# Patient Record
Sex: MALE | Race: WHITE | NOT HISPANIC OR LATINO | URBAN - METROPOLITAN AREA
[De-identification: names, ages, dates, MRNs, and addresses within clinical notes are randomized per-mention and may not be internally consistent; named-entity substitution may affect disease eponyms.]

---

## 2018-01-22 ENCOUNTER — EMERGENCY (EMERGENCY)
Facility: HOSPITAL | Age: 68
LOS: 0 days | Discharge: HOME | End: 2018-01-23
Attending: STUDENT IN AN ORGANIZED HEALTH CARE EDUCATION/TRAINING PROGRAM | Admitting: STUDENT IN AN ORGANIZED HEALTH CARE EDUCATION/TRAINING PROGRAM
Payer: MEDICARE

## 2018-01-22 VITALS
HEART RATE: 89 BPM | DIASTOLIC BLOOD PRESSURE: 92 MMHG | TEMPERATURE: 99 F | OXYGEN SATURATION: 100 % | RESPIRATION RATE: 18 BRPM | HEIGHT: 66 IN | WEIGHT: 205.91 LBS | SYSTOLIC BLOOD PRESSURE: 148 MMHG

## 2018-01-22 DIAGNOSIS — Z95.5 PRESENCE OF CORONARY ANGIOPLASTY IMPLANT AND GRAFT: ICD-10-CM

## 2018-01-22 DIAGNOSIS — R42 DIZZINESS AND GIDDINESS: ICD-10-CM

## 2018-01-22 DIAGNOSIS — E78.5 HYPERLIPIDEMIA, UNSPECIFIED: ICD-10-CM

## 2018-01-22 DIAGNOSIS — I10 ESSENTIAL (PRIMARY) HYPERTENSION: ICD-10-CM

## 2018-01-22 LAB
ALBUMIN SERPL ELPH-MCNC: 3.7 G/DL — SIGNIFICANT CHANGE UP (ref 3.3–5)
ALP SERPL-CCNC: 76 U/L — SIGNIFICANT CHANGE UP (ref 40–120)
ALT FLD-CCNC: 42 U/L — SIGNIFICANT CHANGE UP (ref 12–78)
ANION GAP SERPL CALC-SCNC: 8 MMOL/L — SIGNIFICANT CHANGE UP (ref 5–17)
APPEARANCE UR: CLEAR — SIGNIFICANT CHANGE UP
AST SERPL-CCNC: 32 U/L — SIGNIFICANT CHANGE UP (ref 15–37)
BASOPHILS # BLD AUTO: 0.1 K/UL — SIGNIFICANT CHANGE UP (ref 0–0.2)
BASOPHILS NFR BLD AUTO: 1 % — SIGNIFICANT CHANGE UP (ref 0–2)
BILIRUB SERPL-MCNC: 0.4 MG/DL — SIGNIFICANT CHANGE UP (ref 0.2–1.2)
BILIRUB UR-MCNC: NEGATIVE — SIGNIFICANT CHANGE UP
BUN SERPL-MCNC: 11 MG/DL — SIGNIFICANT CHANGE UP (ref 7–23)
CALCIUM SERPL-MCNC: 8.6 MG/DL — SIGNIFICANT CHANGE UP (ref 8.5–10.1)
CHLORIDE SERPL-SCNC: 108 MMOL/L — SIGNIFICANT CHANGE UP (ref 96–108)
CO2 SERPL-SCNC: 26 MMOL/L — SIGNIFICANT CHANGE UP (ref 22–31)
COLOR SPEC: YELLOW — SIGNIFICANT CHANGE UP
CREAT SERPL-MCNC: 1.16 MG/DL — SIGNIFICANT CHANGE UP (ref 0.5–1.3)
DIFF PNL FLD: NEGATIVE — SIGNIFICANT CHANGE UP
EOSINOPHIL # BLD AUTO: 0.4 K/UL — SIGNIFICANT CHANGE UP (ref 0–0.5)
EOSINOPHIL NFR BLD AUTO: 4.5 % — SIGNIFICANT CHANGE UP (ref 0–6)
GLUCOSE SERPL-MCNC: 110 MG/DL — HIGH (ref 70–99)
GLUCOSE UR QL: NEGATIVE MG/DL — SIGNIFICANT CHANGE UP
HCT VFR BLD CALC: 45.4 % — SIGNIFICANT CHANGE UP (ref 39–50)
HGB BLD-MCNC: 15.2 G/DL — SIGNIFICANT CHANGE UP (ref 13–17)
INR BLD: 1.02 RATIO — SIGNIFICANT CHANGE UP (ref 0.88–1.16)
KETONES UR-MCNC: NEGATIVE — SIGNIFICANT CHANGE UP
LEUKOCYTE ESTERASE UR-ACNC: NEGATIVE — SIGNIFICANT CHANGE UP
LYMPHOCYTES # BLD AUTO: 1.8 K/UL — SIGNIFICANT CHANGE UP (ref 1–3.3)
LYMPHOCYTES # BLD AUTO: 22.6 % — SIGNIFICANT CHANGE UP (ref 13–44)
MCHC RBC-ENTMCNC: 29.1 PG — SIGNIFICANT CHANGE UP (ref 27–34)
MCHC RBC-ENTMCNC: 33.5 GM/DL — SIGNIFICANT CHANGE UP (ref 32–36)
MCV RBC AUTO: 86.8 FL — SIGNIFICANT CHANGE UP (ref 80–100)
MONOCYTES # BLD AUTO: 0.6 K/UL — SIGNIFICANT CHANGE UP (ref 0–0.9)
MONOCYTES NFR BLD AUTO: 7.6 % — SIGNIFICANT CHANGE UP (ref 2–14)
NEUTROPHILS # BLD AUTO: 5.3 K/UL — SIGNIFICANT CHANGE UP (ref 1.8–7.4)
NEUTROPHILS NFR BLD AUTO: 64.4 % — SIGNIFICANT CHANGE UP (ref 43–77)
NITRITE UR-MCNC: NEGATIVE — SIGNIFICANT CHANGE UP
PH UR: 6.5 — SIGNIFICANT CHANGE UP (ref 5–8)
PLATELET # BLD AUTO: 181 K/UL — SIGNIFICANT CHANGE UP (ref 150–400)
POTASSIUM SERPL-MCNC: 3.9 MMOL/L — SIGNIFICANT CHANGE UP (ref 3.5–5.3)
POTASSIUM SERPL-SCNC: 3.9 MMOL/L — SIGNIFICANT CHANGE UP (ref 3.5–5.3)
PROT SERPL-MCNC: 7.1 GM/DL — SIGNIFICANT CHANGE UP (ref 6–8.3)
PROT UR-MCNC: NEGATIVE MG/DL — SIGNIFICANT CHANGE UP
PROTHROM AB SERPL-ACNC: 11 SEC — SIGNIFICANT CHANGE UP (ref 9.8–12.7)
RAPID RVP RESULT: SIGNIFICANT CHANGE UP
RBC # BLD: 5.23 M/UL — SIGNIFICANT CHANGE UP (ref 4.2–5.8)
RBC # FLD: 11.9 % — SIGNIFICANT CHANGE UP (ref 10.3–14.5)
SODIUM SERPL-SCNC: 142 MMOL/L — SIGNIFICANT CHANGE UP (ref 135–145)
SP GR SPEC: 1.01 — SIGNIFICANT CHANGE UP (ref 1.01–1.02)
TROPONIN I SERPL-MCNC: <0.015 NG/ML — SIGNIFICANT CHANGE UP (ref 0.01–0.04)
TROPONIN I SERPL-MCNC: <0.015 NG/ML — SIGNIFICANT CHANGE UP (ref 0.01–0.04)
UROBILINOGEN FLD QL: NEGATIVE MG/DL — SIGNIFICANT CHANGE UP
WBC # BLD: 8.2 K/UL — SIGNIFICANT CHANGE UP (ref 3.8–10.5)
WBC # FLD AUTO: 8.2 K/UL — SIGNIFICANT CHANGE UP (ref 3.8–10.5)

## 2018-01-22 PROCEDURE — 93010 ELECTROCARDIOGRAM REPORT: CPT

## 2018-01-22 PROCEDURE — 71045 X-RAY EXAM CHEST 1 VIEW: CPT | Mod: 26

## 2018-01-22 PROCEDURE — 70450 CT HEAD/BRAIN W/O DYE: CPT | Mod: 26

## 2018-01-22 PROCEDURE — 99285 EMERGENCY DEPT VISIT HI MDM: CPT

## 2018-01-22 RX ORDER — SODIUM CHLORIDE 9 MG/ML
500 INJECTION INTRAMUSCULAR; INTRAVENOUS; SUBCUTANEOUS ONCE
Qty: 0 | Refills: 0 | Status: COMPLETED | OUTPATIENT
Start: 2018-01-22 | End: 2018-01-22

## 2018-01-22 RX ORDER — ONDANSETRON 8 MG/1
4 TABLET, FILM COATED ORAL ONCE
Qty: 0 | Refills: 0 | Status: COMPLETED | OUTPATIENT
Start: 2018-01-22 | End: 2018-01-22

## 2018-01-22 RX ORDER — MECLIZINE HCL 12.5 MG
25 TABLET ORAL ONCE
Qty: 0 | Refills: 0 | Status: COMPLETED | OUTPATIENT
Start: 2018-01-22 | End: 2018-01-22

## 2018-01-22 RX ORDER — SIMETHICONE 80 MG/1
80 TABLET, CHEWABLE ORAL ONCE
Qty: 0 | Refills: 0 | Status: COMPLETED | OUTPATIENT
Start: 2018-01-22 | End: 2018-01-22

## 2018-01-22 RX ADMIN — ONDANSETRON 4 MILLIGRAM(S): 8 TABLET, FILM COATED ORAL at 20:59

## 2018-01-22 RX ADMIN — Medication 25 MILLIGRAM(S): at 20:59

## 2018-01-22 RX ADMIN — SIMETHICONE 80 MILLIGRAM(S): 80 TABLET, CHEWABLE ORAL at 23:37

## 2018-01-22 RX ADMIN — SODIUM CHLORIDE 500 MILLILITER(S): 9 INJECTION INTRAMUSCULAR; INTRAVENOUS; SUBCUTANEOUS at 20:59

## 2018-01-22 NOTE — ED PROVIDER NOTE - MEDICAL DECISION MAKING DETAILS
66 y/o male with lightheadedness, nausea, dizziness. EKG, CXR, CT head, urine, reevaluate. 68 y/o male with lightheadedness, nausea, dizziness. EKG, CXR, CT head, labs; UA; re-eval

## 2018-01-22 NOTE — ED PROVIDER NOTE - OBJECTIVE STATEMENT
68 y/o male with no PMHx of HTN, HLD, cardiac stent presents to the ED c/o dizziness. Pt states that he sat down for dinner when he felt a sudden onset of sx. Worsens upon moving head, positional change. + nausea. +Lightheadedness, which has since then resolved. Denies prior episodes, vertigo in the past.  Denies CP, chest pressure, SOB, cough, fever, generalized myalgias. Dr. Mireles/Cardio. Received a flu shot. Cardizem, Cozaar. 66 y/o male with no PMHx of HTN, HLD, cardiac stent presents to the ED c/o dizziness. Pt states that he sat down for dinner when he felt a sudden onset of sx. Worsens upon moving head, positional change. + nausea. +Lightheadedness, which has since then resolved. Denies prior episodes, vertigo in the past.  Denies CP, chest pressure, SOB, cough, fever, generalized myalgias. Dr. Mireles/Cardio. Received a flu shot. Cardizem, Cozaar. Patient reports that he called his cardiologist at time of incident- EKG was unchanged from old; no sick contacts; feels better upon arrival.

## 2018-01-22 NOTE — ED PROVIDER NOTE - NEUROLOGICAL, MLM
Alert and oriented, no focal deficits, no motor or sensory deficits. Alert and oriented, no focal deficits, no motor or sensory deficits; no cerebellar ataxia; finger to nose wnl

## 2018-01-22 NOTE — ED PROVIDER NOTE - PROGRESS NOTE DETAILS
Pt is resting comfortably. No acute sx/complaints in ED at this time. second cardiac enzymes is normal and ct head normal . pt sitting up eating in bed. signed out from Dr. Orellana, bobby d/c pt froylan wagner

## 2018-01-23 VITALS
HEART RATE: 70 BPM | RESPIRATION RATE: 18 BRPM | OXYGEN SATURATION: 100 % | TEMPERATURE: 98 F | SYSTOLIC BLOOD PRESSURE: 124 MMHG | DIASTOLIC BLOOD PRESSURE: 87 MMHG

## 2020-02-06 PROBLEM — Z95.5 PRESENCE OF CORONARY ANGIOPLASTY IMPLANT AND GRAFT: Chronic | Status: ACTIVE | Noted: 2018-01-22

## 2020-02-06 PROBLEM — I10 ESSENTIAL (PRIMARY) HYPERTENSION: Chronic | Status: ACTIVE | Noted: 2018-01-22

## 2020-02-06 PROBLEM — E78.5 HYPERLIPIDEMIA, UNSPECIFIED: Chronic | Status: ACTIVE | Noted: 2018-01-22

## 2020-02-17 ENCOUNTER — APPOINTMENT (OUTPATIENT)
Dept: ALLERGY | Facility: CLINIC | Age: 70
End: 2020-02-17
Payer: MEDICARE

## 2020-02-17 VITALS
RESPIRATION RATE: 16 BRPM | OXYGEN SATURATION: 98 % | DIASTOLIC BLOOD PRESSURE: 88 MMHG | HEART RATE: 80 BPM | SYSTOLIC BLOOD PRESSURE: 144 MMHG

## 2020-02-17 PROCEDURE — 99204 OFFICE O/P NEW MOD 45 MIN: CPT

## 2020-02-17 RX ORDER — DILTIAZEM HYDROCHLORIDE 90 MG/1
TABLET ORAL
Refills: 0 | Status: ACTIVE | COMMUNITY

## 2020-02-17 RX ORDER — PRASUGREL HYDROCHLORIDE 10 MG/1
TABLET, COATED ORAL
Refills: 0 | Status: ACTIVE | COMMUNITY

## 2020-02-17 RX ORDER — PITAVASTATIN CALCIUM 4.18 MG/1
TABLET, FILM COATED ORAL
Refills: 0 | Status: ACTIVE | COMMUNITY

## 2020-02-17 RX ORDER — OMEPRAZOLE 20 MG/1
20 CAPSULE, DELAYED RELEASE ORAL
Refills: 0 | Status: ACTIVE | COMMUNITY

## 2020-02-17 NOTE — PHYSICAL EXAM
[Alert] : alert [Well Nourished] : well nourished [No Acute Distress] : no acute distress [Well Developed] : well developed [Healthy Appearance] : healthy appearance [Normal Pupil & Iris Size/Symmetry] : normal pupil and iris size and symmetry [No Discharge] : no discharge [No Photophobia] : no photophobia [Sclera Not Icteric] : sclera not icteric [Normal Nasal Mucosa] : the nasal mucosa was normal [Normal Lips/Tongue] : the lips and tongue were normal [Normal Dentition] : normal dentition [Normal Tonsils] : normal tonsils [No Oral Lesions or Ulcers] : no oral lesions or ulcers [No Neck Mass] : no neck mass was observed [No LAD] : no lymphadenopathy [No Thyroid Mass] : no thyroid mass [Supple] : the neck was supple [Normal Palpation] : palpation of the chest revealed no abnormalities [Normal Rate and Effort] : normal respiratory rhythm and effort [No Retractions] : no retractions [No Crackles] : no crackles [Bilateral Audible Breath Sounds] : bilateral audible breath sounds [No murmur] : no murmur [Normal Rate] : heart rate was normal  [Normal S1, S2] : normal S1 and S2 [Regular Rhythm] : with a regular rhythm [Normal Cervical Lymph Nodes] : cervical [Skin Intact] : skin intact  [No Rash] : no rash [No Skin Lesions] : no skin lesions [No clubbing] : no clubbing [No Joint Swelling or Erythema] : no joint swelling or erythema [No Cyanosis] : no cyanosis [No Edema] : no edema [Normal Mood] : mood was normal [Alert, Awake, Oriented as Age-Appropriate] : alert, awake, oriented as age appropriate [Normal Affect] : affect was normal

## 2020-02-24 ENCOUNTER — APPOINTMENT (OUTPATIENT)
Dept: ALLERGY | Facility: CLINIC | Age: 70
End: 2020-02-24

## 2020-02-25 NOTE — HISTORY OF PRESENT ILLNESS
[Asthma] : asthma [Allergic Rhinitis] : allergic rhinitis [Eczematous rashes] : eczematous rashes [Venom Reactions] : venom reactions [de-identified] : About 12 years ago he noted a lump sensation in his upper throat - he had a GI and cardiac work up - normal evaluation.   About 18 months later he felt that if he took an aspirin - Motrin - Aleve - about 4-5 hours later he would develop a sensation of throat fullness.   Symptoms resolved sometimes without medications or with Benadryl.   His symptoms have not recurred since he stopped the medications. \par \par He recently had sciatica and was treated with prednisone and Medrol.   \par \par He has a history of colitis.   History of rotator cuff injury.  Patient almost required back surgery secondary to sciatica but his symptoms lessened with high dose steroids.    Patient took Pepto Bismol and he tolerated the drug challenge with no reaction.  \par \par He saw an allergist 4 years ago - routine allergy skin testing

## 2020-02-25 NOTE — SOCIAL HISTORY
[Spouse/Partner] : spouse/partner [None] : none [] :  [FreeTextEntry2] : Retired  [Smokers in Household] : there are no smokers in the home

## 2020-02-25 NOTE — ASSESSMENT
[FreeTextEntry1] : Delayed reaction to NSAIDs - patient has tolerated Pepto Bismol with no reaction.   Mr. Herman has had severe sciatica and a rotator cuff injury which has been treated with oral steroids.   He will RV for an incremental oral challenge to Celebrex.  In addition he was advised bring in Pepto Bismol he had taken so I can determine what dosage of ASA he has tolerated. \par \par Addendum:  Mr. Herman cancelled his oral challenge and he has not set up any follow up appointment.

## 2020-02-25 NOTE — CONSULT LETTER
[Thank you for referring [unfilled] for consultation for _____] : Thank you for referring [unfilled] for consultation for [unfilled] [Dear  ___] : Dear  [unfilled], [Sincerely,] : Sincerely, [Please see my note below.] : Please see my note below. [FreeTextEntry3] : Mitchell B. Boxer, M.D., FAAAAI\par [facility]\par \par Department of Allergy-Immunology\par [facility] \par [address] [place] [address]\par [place] [address]\par Tel:   [phone]\par Fax:  [phone]\par Email: [email or web address]\par

## 2020-06-01 DIAGNOSIS — R09.89 OTHER SPECIFIED SYMPTOMS AND SIGNS INVOLVING THE CIRCULATORY AND RESPIRATORY SYSTEMS: ICD-10-CM

## 2020-06-02 ENCOUNTER — NON-APPOINTMENT (OUTPATIENT)
Age: 70
End: 2020-06-02

## 2020-06-05 ENCOUNTER — APPOINTMENT (OUTPATIENT)
Dept: OTOLARYNGOLOGY | Facility: CLINIC | Age: 70
End: 2020-06-05
Payer: MEDICARE

## 2020-06-05 VITALS
HEIGHT: 66 IN | DIASTOLIC BLOOD PRESSURE: 90 MMHG | HEART RATE: 112 BPM | WEIGHT: 225 LBS | SYSTOLIC BLOOD PRESSURE: 157 MMHG | TEMPERATURE: 97.34 F | BODY MASS INDEX: 36.16 KG/M2

## 2020-06-05 DIAGNOSIS — J31.0 CHRONIC RHINITIS: ICD-10-CM

## 2020-06-05 DIAGNOSIS — R20.8 OTHER DISTURBANCES OF SKIN SENSATION: ICD-10-CM

## 2020-06-05 PROCEDURE — 99204 OFFICE O/P NEW MOD 45 MIN: CPT

## 2020-06-05 RX ORDER — LIDOCAINE HYDROCHLORIDE 40 MG/ML
4 SOLUTION TOPICAL 3 TIMES DAILY
Qty: 1 | Refills: 0 | Status: ACTIVE | COMMUNITY
Start: 2020-06-05 | End: 1900-01-01

## 2020-06-05 NOTE — PHYSICAL EXAM
[Midline] : trachea located in midline position [Normal] : orientation to person, place, and time: normal [de-identified] : Small excoriation on left anterior inferior nasal septum on septal spur

## 2020-06-05 NOTE — HISTORY OF PRESENT ILLNESS
[de-identified] : 69 y/o M, Notes yesterday had Colonoscopy / endoscopy.  When he woke up had severe left nasal burning.  Today had some sinus pressure and clear nasal d/c.  Called Dr. Chavez and placed on Medrol dose pack that he started this morning.  Also used Azelastine and Flonase today.   He is here now concerned that the nasal canula may have eroded a hole in the septum.

## 2020-06-05 NOTE — ASSESSMENT
[FreeTextEntry1] : Left nasal septum with small ulceration, likely due to nasal cannula prong rubbing on left deviated septum/septal spur during colonoscopy:\par - reassured no evidence of septal perforation or any serious damage, rather a superficial excoriation which should heal quickly\par - Use mupirocin to the septum for one week\par - Lidocaine to left nasal septum for pain PRN. \par - F/U PRN

## 2020-06-08 ENCOUNTER — APPOINTMENT (OUTPATIENT)
Dept: OTOLARYNGOLOGY | Facility: CLINIC | Age: 70
End: 2020-06-08

## 2020-06-10 ENCOUNTER — APPOINTMENT (OUTPATIENT)
Dept: OTOLARYNGOLOGY | Facility: CLINIC | Age: 70
End: 2020-06-10
Payer: MEDICARE

## 2020-06-10 VITALS
SYSTOLIC BLOOD PRESSURE: 125 MMHG | TEMPERATURE: 208.22 F | BODY MASS INDEX: 36.16 KG/M2 | HEIGHT: 66 IN | DIASTOLIC BLOOD PRESSURE: 78 MMHG | WEIGHT: 225 LBS

## 2020-06-10 DIAGNOSIS — J34.89 OTHER SPECIFIED DISORDERS OF NOSE AND NASAL SINUSES: ICD-10-CM

## 2020-06-10 PROCEDURE — 99213 OFFICE O/P EST LOW 20 MIN: CPT | Mod: 25

## 2020-06-10 PROCEDURE — 31231 NASAL ENDOSCOPY DX: CPT

## 2020-06-10 RX ORDER — DILTIAZEM HYDROCHLORIDE 120 MG/1
120 CAPSULE, EXTENDED RELEASE ORAL
Qty: 90 | Refills: 0 | Status: ACTIVE | COMMUNITY
Start: 2020-04-06

## 2020-06-10 RX ORDER — MUPIROCIN 20 MG/G
2 OINTMENT TOPICAL
Qty: 22 | Refills: 0 | Status: ACTIVE | COMMUNITY
Start: 2020-06-01

## 2020-06-10 RX ORDER — MESALAMINE 4 G/60ML
4 ENEMA RECTAL
Qty: 1680 | Refills: 0 | Status: ACTIVE | COMMUNITY
Start: 2020-06-04

## 2020-06-10 RX ORDER — AZELASTINE HYDROCHLORIDE 137 UG/1
0.1 SPRAY, METERED NASAL
Qty: 90 | Refills: 0 | Status: ACTIVE | COMMUNITY
Start: 2020-03-23

## 2020-06-10 RX ORDER — TOBRAMYCIN AND DEXAMETHASONE 3; 1 MG/ML; MG/ML
0.3-0.1 SUSPENSION/ DROPS OPHTHALMIC
Qty: 5 | Refills: 0 | Status: ACTIVE | COMMUNITY
Start: 2020-05-07

## 2020-06-10 RX ORDER — TESTOSTERONE 16.2 MG/G
20.25 MG/ACT GEL TRANSDERMAL
Qty: 225 | Refills: 0 | Status: ACTIVE | COMMUNITY
Start: 2020-05-06

## 2020-06-10 RX ORDER — METHYLPREDNISOLONE 4 MG/1
4 TABLET ORAL
Qty: 21 | Refills: 0 | Status: ACTIVE | COMMUNITY
Start: 2020-06-05

## 2020-06-10 RX ORDER — LOSARTAN POTASSIUM 50 MG/1
50 TABLET, FILM COATED ORAL
Qty: 90 | Refills: 0 | Status: ACTIVE | COMMUNITY
Start: 2020-04-03

## 2020-06-10 RX ORDER — HYDROCORTISONE 25 MG/G
2.5 CREAM TOPICAL
Qty: 60 | Refills: 0 | Status: ACTIVE | COMMUNITY
Start: 2020-03-02

## 2020-06-10 NOTE — REASON FOR VISIT
[Subsequent Evaluation] : a subsequent evaluation for [FreeTextEntry2] : left nasal congestion/irritation

## 2020-06-10 NOTE — ASSESSMENT
[FreeTextEntry1] : Left nasal septum with small ulceration, now resolved.  Nasal discomfort likely due to irritation\par - stop topical antibiotics which may be causing more irritation\par - Continue with Flonase and Azelastine for congestion. \par - resume sinus wash. \par - F/U PRN

## 2020-06-10 NOTE — PHYSICAL EXAM
[Midline] : trachea located in midline position [de-identified] : well healed septum with no abnormalities other than spur [Normal] : no nystagmus [de-identified] : left medial subconjunctival hemorrhage, no chemosis or erythema, no swelling independent/needs device

## 2020-06-10 NOTE — PROCEDURE
[FreeTextEntry6] : Afrin and lidocaine were topically applied. Flexible scope #25 was used. Right nasal passage with normal inferior, middle and superior turbinates. Nasal passage patent with clear middle meatus and sphenoethmoid recess. No mucopurulence or polyps appreciated. No lesions or trauma noted. Nasopharynx clear.\par \par

## 2020-06-10 NOTE — HISTORY OF PRESENT ILLNESS
[de-identified] : 69 y/o M, At last visit noted to have mild ulceration on left nasal septum.  Using Mupirocin and Flonase and neosporin.\par Now notes increased nasal congestion on left side.  Notes needs to blow nose forcefully multiple times a day. He blew nose so forcefully that he caused a left subconjunctival hemorrhage. He has had this evaluated by ophtho and assured will resolve. Notes no bleeding from the nose. No discolored discharge. Significant discomfort and congestion at night makes sleeping difficult.

## 2020-11-09 ENCOUNTER — APPOINTMENT (OUTPATIENT)
Dept: OTOLARYNGOLOGY | Facility: CLINIC | Age: 70
End: 2020-11-09
Payer: MEDICARE

## 2020-11-09 VITALS
SYSTOLIC BLOOD PRESSURE: 137 MMHG | BODY MASS INDEX: 31.82 KG/M2 | WEIGHT: 198 LBS | HEIGHT: 66 IN | TEMPERATURE: 206.96 F | DIASTOLIC BLOOD PRESSURE: 83 MMHG | HEART RATE: 74 BPM

## 2020-11-09 DIAGNOSIS — G47.33 OBSTRUCTIVE SLEEP APNEA (ADULT) (PEDIATRIC): ICD-10-CM

## 2020-11-09 PROCEDURE — 31231 NASAL ENDOSCOPY DX: CPT

## 2020-11-09 PROCEDURE — 99214 OFFICE O/P EST MOD 30 MIN: CPT | Mod: 25

## 2020-11-09 RX ORDER — AZELASTINE HYDROCHLORIDE AND FLUTICASONE PROPIONATE 137; 50 UG/1; UG/1
137-50 SPRAY, METERED NASAL
Qty: 1 | Refills: 0 | Status: ACTIVE | COMMUNITY
Start: 2020-11-09 | End: 1900-01-01

## 2020-11-09 NOTE — ASSESSMENT
[FreeTextEntry1] : Patient with severe obstructive sleep apnea has lost 18 pounds since his last visit looks great.  Complaining of some continued nasal congestion currently taking Flonase as well as Astelin Astelin but he stopped using it and he certainly understands that it may help him.  He has had some GI issues with significant belching further questioning he has been taking Mucinex quite a bit told him to stop that because it can have some GI side effects.  The rest of his examination is normal endoscopically does have his baseline deviated septum.  He is heading in the right direction we will try to get Dymista or if it is covered otherwise he will religiously use the Flonase and the Astelin follow-up and see us as needed.

## 2020-11-09 NOTE — HISTORY OF PRESENT ILLNESS
[de-identified] : Since February or march of this year patient has had excessive belching. He was been told by his GI that he may be breathing through his mouth and swallowing air. He uses a CPAP to breathe for his apnea and states that he gets nasally congestion for about a half hour the middle of the night. He is on Pepcid daily but this has not helped. He admits that he is a mouth breather during the day as well as at night. He had a gallbladder US that showed stones but nothing else. He feels like his blood pressure rises in the middle of the night and this causes. He was prescribed azelastine but has not been using this, and instead is using the flonase. He has also tried  Mucinex 12 hour as well frequently over the last several months or so.

## 2020-11-09 NOTE — REVIEW OF SYSTEMS
[Nasal Congestion] : nasal congestion [Problem Snoring] : problem snoring [Snoring With Pauses] : snoring with pauses [Negative] : Mouth and Throat [de-identified] : uses CPAP [FreeTextEntry7] : belching

## 2020-11-12 RX ORDER — AZELASTINE HYDROCHLORIDE 137 UG/1
137 SPRAY, METERED NASAL DAILY
Qty: 3 | Refills: 3 | Status: ACTIVE | COMMUNITY
Start: 2020-11-12 | End: 1900-01-01

## 2020-11-12 RX ORDER — FLUTICASONE PROPIONATE 50 UG/1
50 SPRAY, METERED NASAL DAILY
Qty: 1 | Refills: 2 | Status: ACTIVE | COMMUNITY
Start: 2020-11-12 | End: 1900-01-01

## 2020-12-11 ENCOUNTER — NON-APPOINTMENT (OUTPATIENT)
Age: 70
End: 2020-12-11

## 2021-03-19 ENCOUNTER — APPOINTMENT (OUTPATIENT)
Dept: OTOLARYNGOLOGY | Facility: CLINIC | Age: 71
End: 2021-03-19
Payer: MEDICARE

## 2021-03-19 VITALS
HEIGHT: 66 IN | DIASTOLIC BLOOD PRESSURE: 82 MMHG | WEIGHT: 205 LBS | SYSTOLIC BLOOD PRESSURE: 134 MMHG | HEART RATE: 80 BPM | BODY MASS INDEX: 32.95 KG/M2 | TEMPERATURE: 95 F

## 2021-03-19 DIAGNOSIS — H93.13 TINNITUS, BILATERAL: ICD-10-CM

## 2021-03-19 PROCEDURE — 99214 OFFICE O/P EST MOD 30 MIN: CPT

## 2021-03-19 NOTE — ASSESSMENT
[FreeTextEntry1] : Patient is a 70 year old male who presents with new onset b/l tinnitus.\par \par Plan\par -Patient returns new onset of tinnitus is more concerned in terms of his anxiety we explained everything to him the most likely this will resolve he will continue to follow-up with us as needed he is continuing to lose weight he looks great he does not want a hearing test at this time will get it at next visit on examination is ears look perfectly normal with no cerumen or no infection.

## 2022-10-26 ENCOUNTER — APPOINTMENT (OUTPATIENT)
Dept: OTOLARYNGOLOGY | Facility: CLINIC | Age: 72
End: 2022-10-26

## 2022-10-26 VITALS
HEIGHT: 66 IN | DIASTOLIC BLOOD PRESSURE: 67 MMHG | HEART RATE: 93 BPM | BODY MASS INDEX: 34.07 KG/M2 | SYSTOLIC BLOOD PRESSURE: 124 MMHG | WEIGHT: 212 LBS

## 2022-10-26 DIAGNOSIS — H65.92 UNSPECIFIED NONSUPPURATIVE OTITIS MEDIA, LEFT EAR: ICD-10-CM

## 2022-10-26 PROCEDURE — 92567 TYMPANOMETRY: CPT

## 2022-10-26 PROCEDURE — 92557 COMPREHENSIVE HEARING TEST: CPT

## 2022-10-26 PROCEDURE — 99214 OFFICE O/P EST MOD 30 MIN: CPT

## 2022-10-26 RX ORDER — AMOXICILLIN AND CLAVULANATE POTASSIUM 875; 125 MG/1; MG/1
875-125 TABLET, COATED ORAL
Qty: 28 | Refills: 0 | Status: ACTIVE | COMMUNITY
Start: 2022-10-22

## 2022-10-26 RX ORDER — CLINDAMYCIN PHOSPHATE 1 G/10ML
1 GEL TOPICAL
Qty: 60 | Refills: 0 | Status: ACTIVE | COMMUNITY
Start: 2022-07-27

## 2022-10-26 RX ORDER — AZITHROMYCIN 250 MG/1
250 TABLET, FILM COATED ORAL
Qty: 6 | Refills: 0 | Status: ACTIVE | COMMUNITY
Start: 2022-10-12

## 2022-10-26 RX ORDER — SILDENAFIL 20 MG/1
20 TABLET ORAL
Qty: 90 | Refills: 0 | Status: ACTIVE | COMMUNITY
Start: 2022-08-10

## 2022-10-26 RX ORDER — PREGABALIN 25 MG/1
25 CAPSULE ORAL
Qty: 360 | Refills: 0 | Status: ACTIVE | COMMUNITY
Start: 2022-02-25

## 2022-10-26 RX ORDER — MAGNESIUM HYDROXIDE 1200 MG/15ML
0.9 LIQUID ORAL
Qty: 9000 | Refills: 0 | Status: ACTIVE | COMMUNITY
Start: 2022-10-14

## 2022-10-26 RX ORDER — ALIROCUMAB 75 MG/ML
75 INJECTION, SOLUTION SUBCUTANEOUS
Qty: 6 | Refills: 0 | Status: ACTIVE | COMMUNITY
Start: 2022-07-05

## 2022-10-26 RX ORDER — CELECOXIB 50 MG/1
50 CAPSULE ORAL
Qty: 30 | Refills: 0 | Status: ACTIVE | COMMUNITY
Start: 2022-08-17

## 2022-10-26 RX ORDER — ALFUZOSIN HYDROCHLORIDE 10 MG/1
10 TABLET, EXTENDED RELEASE ORAL
Qty: 90 | Refills: 0 | Status: ACTIVE | COMMUNITY
Start: 2022-10-17

## 2022-10-26 RX ORDER — ALBUTEROL SULFATE 90 UG/1
108 (90 BASE) INHALANT RESPIRATORY (INHALATION)
Qty: 8 | Refills: 0 | Status: ACTIVE | COMMUNITY
Start: 2022-10-12

## 2022-10-26 RX ORDER — PRASUGREL 5 MG/1
5 TABLET, FILM COATED ORAL
Qty: 180 | Refills: 0 | Status: ACTIVE | COMMUNITY
Start: 2022-06-25

## 2022-10-26 RX ORDER — TAMSULOSIN HYDROCHLORIDE 0.4 MG/1
0.4 CAPSULE ORAL
Qty: 90 | Refills: 0 | Status: ACTIVE | COMMUNITY
Start: 2022-05-09

## 2022-10-26 NOTE — ASSESSMENT
[FreeTextEntry1] : Patient seen had an episode of upper respiratory tract infection that is resulted in clogged left ear he was treated with steroids and his ear opened up for a day and then got clogged after he stopped he is now back on a steroid taper.  He feels that his left ear is still not unclog it is bothering him on examination he does not look terrible right ear looks fine audiogram was performed seems to be all consistent with eustachian tube dysfunction type C temp of his left ear consistent with negative pressure.  Hearing is otherwise symmetrical except for slight asymmetry in the high frequencies on the left side which will be monitored.  Follow-up and see us in a month.  If that asymmetry were to worsen consideration obviously for an MRI to rule out an unlikely acoustic neuroma will be considered.

## 2022-10-26 NOTE — END OF VISIT
[FreeTextEntry3] : I, Dr. Chavez personally performed the evaluation and management (E/M) services including all necessary procedures, for this new patient. That E/M includes conducting the clinically appropriate initial history &/or exam, assessing all conditions, and establishing the plan of care. Today, my ANASTASIA, Abigail Brothers, was here to observe &/or participate in the visit & follow plan of care established by me.\par \par \par

## 2022-10-26 NOTE — REVIEW OF SYSTEMS
[Hearing Loss] : hearing loss [Negative] : Heme/Lymph [de-identified] : left ear hearing loss and clogging

## 2022-10-26 NOTE — HISTORY OF PRESENT ILLNESS
[de-identified] : Three weeks ago patient got a cough, productive, yellow green mucus, and feels it went into his left ear. He describes that his left ear feels constantly clogged and blocked. He was on Augmentin for the left ear- another doctor placed him medrol this past sunday, but Monday morning after day 1 of the steroid pack, he woke up and felt better- then two hours later he was clogged again- worse. He spoke to another MD who this time prescribed a prednisone taper, 40 mg today, 30mg tomorrow, etc, as well as Sudafed (but this affects his prostate and speeds heart so he cant take that) \par \par THe main complaint is the blockage of the left ear that has persisted this entire time. He had a hearing test at [facility] last week and states that the left ear showed hearing loss but doesn’t know if it was definitely conductive loss. \par

## 2022-10-26 NOTE — PHYSICAL EXAM
[Nasal Endoscopy Performed] : nasal endoscopy was performed, see procedure section for findings [] : septum deviated to the left [Midline] : trachea located in midline position [Normal] : no rashes [de-identified] : mild serous behind the left TM

## 2022-11-01 ENCOUNTER — APPOINTMENT (OUTPATIENT)
Dept: OTOLARYNGOLOGY | Facility: CLINIC | Age: 72
End: 2022-11-01

## 2022-11-01 VITALS
DIASTOLIC BLOOD PRESSURE: 75 MMHG | TEMPERATURE: 97.4 F | WEIGHT: 213 LBS | HEIGHT: 67 IN | HEART RATE: 76 BPM | BODY MASS INDEX: 33.43 KG/M2 | SYSTOLIC BLOOD PRESSURE: 139 MMHG

## 2022-11-01 DIAGNOSIS — R09.81 NASAL CONGESTION: ICD-10-CM

## 2022-11-01 PROCEDURE — 99214 OFFICE O/P EST MOD 30 MIN: CPT

## 2022-11-01 RX ORDER — LEVOFLOXACIN 750 MG/1
750 TABLET, FILM COATED ORAL DAILY
Qty: 10 | Refills: 2 | Status: ACTIVE | COMMUNITY
Start: 2022-11-01 | End: 1900-01-01

## 2022-11-01 NOTE — HISTORY OF PRESENT ILLNESS
[de-identified] : Last night the left ear started to open a little, but now he has some discomfort along the left throat and continues to feel that the left ear clogged. His voice sounds slightly raspy. He denies dizziness. His situation has worsened in that it is bothering him more and more each day. He prefers to stay home, not go out, and stay in bed in stead as a result of hos he is feeling.

## 2022-11-01 NOTE — REVIEW OF SYSTEMS
[Ear Pain] : ear pain [Hearing Loss] : hearing loss [Nasal Congestion] : nasal congestion [Hoarseness] : hoarseness [Throat Pain] : throat pain [Negative] : Heme/Lymph [de-identified] : ear clogging

## 2022-11-01 NOTE — ASSESSMENT
[FreeTextEntry1] : Patient suffers from anxiety is getting fatigued seems to be crashing from the steroids he was given.  Ear examination shows no reaccumulation of any fluid because of his anxiety I sent her for an MRI to rule out any intracranial involvement more for peace of mind understood.  He will follow-up with us he was reassured he is heading in the right direction.  Do recommend that he see his primary care internist for general physical.  We discussed the importance of him to try to lose weight and continue to increase his exercise load.\par \par \par \par \par Addendum patient had an MRI done at [place] today was found to have a mastoid effusion on the left side for that reason we put him on Levaquin for 10days.

## 2022-11-03 ENCOUNTER — NON-APPOINTMENT (OUTPATIENT)
Age: 72
End: 2022-11-03

## 2022-11-14 ENCOUNTER — APPOINTMENT (OUTPATIENT)
Dept: OTOLARYNGOLOGY | Facility: CLINIC | Age: 72
End: 2022-11-14

## 2022-11-14 PROCEDURE — 99214 OFFICE O/P EST MOD 30 MIN: CPT

## 2022-11-14 PROCEDURE — 92567 TYMPANOMETRY: CPT

## 2022-11-14 PROCEDURE — 92557 COMPREHENSIVE HEARING TEST: CPT

## 2022-11-14 NOTE — END OF VISIT
[FreeTextEntry3] : I, Dr. Chavez personally performed the evaluation and management (E/M) services , including all procedures, for this established patient who presents today with (a) new problem(s)/exacerbation of (an) existing condition(s). That E/M includes conducting the clinically appropriate interval history &/or exam, assessing all new/exacerbated conditions, and establishing a new plan of care. Today, my ANASTASIA, Abigail Brothers, was here to observe &/or participate in the visit & follow plan of care established by me.\par \par \par

## 2022-11-14 NOTE — ASSESSMENT
[FreeTextEntry1] : Patient follows up continues to have intermittent clogged sensation in his left ear on examination again his ear looks fine audiogram confirmed type a temps reassured him to continue to be patient obviously through his patients who can always put a tube in but he understands that that might not necessarily expedite his resolution of his symptoms and create other issues.  Patient does have underlying anxieties we reviewed everything an option and will come back and see us in 2 weeks if at that time he still loses patience consideration for tube will be given but highly unlikely.

## 2022-12-01 ENCOUNTER — APPOINTMENT (OUTPATIENT)
Dept: OTOLARYNGOLOGY | Facility: CLINIC | Age: 72
End: 2022-12-01

## 2022-12-01 VITALS
BODY MASS INDEX: 33.43 KG/M2 | DIASTOLIC BLOOD PRESSURE: 75 MMHG | HEART RATE: 72 BPM | WEIGHT: 213 LBS | HEIGHT: 67 IN | TEMPERATURE: 98 F | SYSTOLIC BLOOD PRESSURE: 135 MMHG

## 2022-12-01 PROCEDURE — 99213 OFFICE O/P EST LOW 20 MIN: CPT

## 2022-12-01 PROCEDURE — 92567 TYMPANOMETRY: CPT

## 2022-12-01 PROCEDURE — 92557 COMPREHENSIVE HEARING TEST: CPT

## 2022-12-01 RX ORDER — FAMOTIDINE 20 MG/1
20 TABLET, FILM COATED ORAL
Qty: 360 | Refills: 0 | Status: ACTIVE | COMMUNITY
Start: 2022-11-05

## 2022-12-01 RX ORDER — FLUTICASONE PROPIONATE 50 UG/1
50 SPRAY, METERED NASAL DAILY
Qty: 1 | Refills: 3 | Status: ACTIVE | COMMUNITY
Start: 2022-12-01 | End: 1900-01-01

## 2022-12-01 NOTE — ASSESSMENT
[FreeTextEntry1] : Patient follows up doing well left ear finally starting to feel normal open most of the time now 80% of the time heading in the right direction audiogram performed perfectly normal back to his baseline no further acute interventions indicated we will follow-up with us in 3 months.

## 2022-12-01 NOTE — HISTORY OF PRESENT ILLNESS
[de-identified] : A week or so ago patients left ear opened. Since then, it has intermittently clogged and then opened up. He definitely feels better and is able to hear. He does not have any acute nasal congestion or runny nose and denies sinus pressure

## 2023-01-09 ENCOUNTER — APPOINTMENT (OUTPATIENT)
Dept: OTOLARYNGOLOGY | Facility: CLINIC | Age: 73
End: 2023-01-09

## 2023-01-17 RX ORDER — CLARITHROMYCIN 250 MG/1
250 TABLET, FILM COATED ORAL
Qty: 14 | Refills: 0 | Status: ACTIVE | COMMUNITY
Start: 2023-01-17 | End: 1900-01-01

## 2023-03-20 ENCOUNTER — APPOINTMENT (OUTPATIENT)
Dept: OTOLARYNGOLOGY | Facility: CLINIC | Age: 73
End: 2023-03-20
Payer: MEDICARE

## 2023-03-20 VITALS
SYSTOLIC BLOOD PRESSURE: 143 MMHG | TEMPERATURE: 97.2 F | HEART RATE: 74 BPM | BODY MASS INDEX: 33.43 KG/M2 | DIASTOLIC BLOOD PRESSURE: 82 MMHG | WEIGHT: 213 LBS | HEIGHT: 67 IN

## 2023-03-20 PROCEDURE — 99213 OFFICE O/P EST LOW 20 MIN: CPT

## 2023-03-20 NOTE — ASSESSMENT
[FreeTextEntry1] : Follows up continues to have intermittent clogging of his left ear consistent with eustachian tube dysfunction most recently lasted about 2 to 3 weeks and just today felt that it popped open we continued a conversation of pros and cons of considering the tube based on his quality of life spring is around the corner we both decided to wait and see what happens in spring and summer if the symptoms were to persist he understands the neck step is to put a tube in his left ear.

## 2023-03-20 NOTE — HISTORY OF PRESENT ILLNESS
[de-identified] : Patient comes in with return of left ear clogging and intermittently popping. He has been using the Flonase with no change.  He admits that he was doing well for two weeks and then this started again, but intermittently. He admits that he is not looking to have an ear tube placed and would like to be treated medically again

## 2023-03-20 NOTE — REVIEW OF SYSTEMS
[As Noted in HPI] : as noted in HPI [Negative] : Heme/Lymph [de-identified] : ear clogging left sided

## 2023-04-27 ENCOUNTER — APPOINTMENT (OUTPATIENT)
Dept: OTOLARYNGOLOGY | Facility: CLINIC | Age: 73
End: 2023-04-27

## 2024-03-14 ENCOUNTER — TRANSCRIPTION ENCOUNTER (OUTPATIENT)
Age: 74
End: 2024-03-14

## 2024-05-17 ENCOUNTER — APPOINTMENT (OUTPATIENT)
Dept: OTOLARYNGOLOGY | Facility: CLINIC | Age: 74
End: 2024-05-17
Payer: MEDICARE

## 2024-05-17 VITALS
HEIGHT: 67 IN | BODY MASS INDEX: 32.33 KG/M2 | WEIGHT: 206 LBS | SYSTOLIC BLOOD PRESSURE: 120 MMHG | HEART RATE: 79 BPM | DIASTOLIC BLOOD PRESSURE: 66 MMHG

## 2024-05-17 PROCEDURE — 99214 OFFICE O/P EST MOD 30 MIN: CPT

## 2024-05-17 NOTE — REASON FOR VISIT
[Subsequent Evaluation] : a subsequent evaluation for [FreeTextEntry2] : Left submandibular discomfort

## 2024-05-17 NOTE — HISTORY OF PRESENT ILLNESS
[de-identified] : Patient since he was last seen had 3 stents placed cardiac wise.  He is developing a little bit of discomfort in his left submandibular region which was tender.  He was put on Zithromax couple days ago.  Feeling a little better.  But still discomfort.  On examination he has a palpable discomfort in the submandibular gland confirmed by manual palpation.  Reassured him he is on the appropriate antibiotics and this will resolve I have encouraged him to continue using some lemon drops and stay relatively hydrated he is on a blood thinner now and will need to be careful regarding trauma.

## 2024-05-28 ENCOUNTER — APPOINTMENT (OUTPATIENT)
Dept: OTOLARYNGOLOGY | Facility: CLINIC | Age: 74
End: 2024-05-28
Payer: MEDICARE

## 2024-05-28 VITALS
BODY MASS INDEX: 32.49 KG/M2 | DIASTOLIC BLOOD PRESSURE: 80 MMHG | HEIGHT: 67 IN | WEIGHT: 207 LBS | SYSTOLIC BLOOD PRESSURE: 127 MMHG

## 2024-05-28 DIAGNOSIS — H69.92 UNSPECIFIED EUSTACHIAN TUBE DISORDER, LEFT EAR: ICD-10-CM

## 2024-05-28 DIAGNOSIS — K11.20 SIALOADENITIS, UNSPECIFIED: ICD-10-CM

## 2024-05-28 DIAGNOSIS — M26.622 ARTHRALGIA OF LEFT TEMPOROMANDIBULAR JOINT: ICD-10-CM

## 2024-05-28 PROCEDURE — 99213 OFFICE O/P EST LOW 20 MIN: CPT

## 2024-05-28 NOTE — PHYSICAL EXAM
[Midline] : trachea located in midline position [Normal] : no rashes [de-identified] : tenderness to the left TMJ

## 2024-05-28 NOTE — END OF VISIT
[FreeTextEntry3] : I, Dr. Chavez personally performed the evaluation and management (E/M) services , including all procedures, for this established patient who presents today with (a) new problem(s)/exacerbation of (an) existing condition(s). That E/M includes conducting the clinically appropriate interval history &/or exam, assessing all new/exacerbated conditions, and establishing a new plan of care. Today, my ANASTASIA, Abigail Brothers, was here to observe &/or participate in the visit & follow plan of care established by me.

## 2024-05-28 NOTE — ASSESSMENT
[FreeTextEntry1] : Patient can now complaining of discomfort in his left ear stool on examination consistent with TMJ his submandibular gland is doing much better his ear is perfectly normal he was reassured he can take Motrin and encouraged him to have salt warm compresses at night that may help and obviously not to keep testing his ear to see if there is still discomfort and stay in a relatively soft diet.

## 2024-05-28 NOTE — HISTORY OF PRESENT ILLNESS
[de-identified] : Patient was last seen two weeks ago with left submandibular discomfort. THi past weekend he had discomfort and issues with chewing. Today he is feeling better but if he touches the area he has pain. This morning when trying to chew he still had issues.  He also has persistent postnasal drip.  The left ear hearing also feels more blocked recently than ever.

## 2025-05-22 NOTE — ED ADULT NURSE NOTE - FALLEN IN THE PAST
"                                                                                                             05/22/2025  Khris Covington is a 29 y.o., male. HIV+  Diagnosis: Chronic pansinusitis [J32.4]   Pre-op diagnosis: Chronic pansinusitis [J32.4]   Location: [facility] OR  / [facility] OR   Surgeons: Al Mcfarland MD           The pt.comes to [facility] for the noted procedure under [facility] (GETA)  Case: [id number] Date/Time: 05/22/25 1245   Procedure: FESS, WITH NASAL SEPTOPLASTY AND TURBINATE REDUCTION / Bilateral // Possible left propel stent vs Left permanent stent (Bilateral)   Anesthesia type: General     PMHx:  No specialty history recorded    Other Medical History   ADHD (attention deficit hyperactivity disorder) Anemia   HIV infection Newly diagnosed HIV-positive   Frequent sinus infections      Problem List  Current as of 05/22/25 1150  Cough HIV infection   Newly diagnosed HIV-positive Pancytopenia   Rash Routine screening for STI (sexually transmitted infection)   Sputum production Thrombocytopenia         PSHx:  Surgical History  WISDOM TOOTH EXTRACTION           Vital signs:  Pre Vitals  Current as of 05/22/25 1150  BP: 163/82 Pulse: 60   Resp: 18 SpO2: 100   Temp: 36.7 °C (98 °F)   Height: 6' 2" (1.88 m) (05/21/25) Weight: 104 kg (229 lb 4.5 oz) (05/22/25)   BMI: 29.44 IBW: 82.2 kg (181 lb 4.8 oz)   Last edited 05/22/25 1128 by RS  Currently displaying vitals information from multiple entries within 15 minutes of most recent vitals.        Lab Data:      EKG:                                  Pre-op Assessment    I have reviewed the Patient Summary Reports.     I have reviewed the Nursing Notes. I have reviewed the NPO Status.   I have reviewed the Medications.     Review of Systems  Anesthesia Hx:  No problems with previous Anesthesia                Social:  Non-Smoker       Hematology/Oncology:  Hematology Normal   Oncology Normal                                   EENT/Dental:  EENT/Dental Normal         "   Cardiovascular:  Cardiovascular Normal Exercise tolerance: good                     Functional Capacity good / => 4 METS                         Pulmonary:  Pulmonary Normal                       Renal/:  Renal/ Normal                 Hepatic/GI:  Hepatic/GI Normal                    Musculoskeletal:  Musculoskeletal Normal                Neurological:  Neurology Normal                                      Endocrine:  Endocrine Normal            Dermatological:  Skin Normal    Psych:  Psychiatric History                  Physical Exam  General: Alert, Oriented, Well nourished and Cooperative    Airway:  Mallampati: II   Mouth Opening: Normal  TM Distance: Normal  Tongue: Normal  Neck ROM: Normal ROM    Dental:  Intact    Chest/Lungs:  Clear to auscultation, Normal Respiratory Rate    Heart:  Rate: Normal  Rhythm: Regular Rhythm        Anesthesia Plan  Type of Anesthesia, risks & benefits discussed:    Anesthesia Type: Gen ETT  Intra-op Monitoring Plan: Standard ASA Monitors  Post Op Pain Control Plan: IV/PO Opioids PRN  Induction:  IV and Inhalation  Airway Plan: Direct  Informed Consent: Informed consent signed with the Patient and all parties understand the risks and agree with anesthesia plan.  All questions answered. Patient consented to blood products? Yes  ASA Score: 3  Day of Surgery Review of History & Physical: H&P Update referred to the surgeon/provider.    Ready For Surgery From Anesthesia Perspective.     .       no